# Patient Record
Sex: FEMALE | Race: WHITE | NOT HISPANIC OR LATINO | URBAN - METROPOLITAN AREA
[De-identification: names, ages, dates, MRNs, and addresses within clinical notes are randomized per-mention and may not be internally consistent; named-entity substitution may affect disease eponyms.]

---

## 2017-03-08 ENCOUNTER — IMPORTED ENCOUNTER (OUTPATIENT)
Dept: URBAN - METROPOLITAN AREA CLINIC 9 | Facility: CLINIC | Age: 77
End: 2017-03-08

## 2017-05-31 ENCOUNTER — IMPORTED ENCOUNTER (OUTPATIENT)
Dept: URBAN - METROPOLITAN AREA CLINIC 9 | Facility: CLINIC | Age: 77
End: 2017-05-31

## 2017-07-11 ENCOUNTER — IMPORTED ENCOUNTER (OUTPATIENT)
Dept: URBAN - METROPOLITAN AREA CLINIC 9 | Facility: CLINIC | Age: 77
End: 2017-07-11

## 2017-08-23 ENCOUNTER — IMPORTED ENCOUNTER (OUTPATIENT)
Dept: URBAN - METROPOLITAN AREA CLINIC 9 | Facility: CLINIC | Age: 77
End: 2017-08-23

## 2018-02-07 ENCOUNTER — IMPORTED ENCOUNTER (OUTPATIENT)
Dept: URBAN - METROPOLITAN AREA CLINIC 9 | Facility: CLINIC | Age: 78
End: 2018-02-07

## 2018-04-17 ENCOUNTER — IMPORTED ENCOUNTER (OUTPATIENT)
Dept: URBAN - METROPOLITAN AREA CLINIC 9 | Facility: CLINIC | Age: 78
End: 2018-04-17

## 2018-05-16 ENCOUNTER — IMPORTED ENCOUNTER (OUTPATIENT)
Dept: URBAN - METROPOLITAN AREA CLINIC 9 | Facility: CLINIC | Age: 78
End: 2018-05-16

## 2018-06-12 ENCOUNTER — IMPORTED ENCOUNTER (OUTPATIENT)
Dept: URBAN - METROPOLITAN AREA CLINIC 9 | Facility: CLINIC | Age: 78
End: 2018-06-12

## 2018-06-13 ENCOUNTER — IMPORTED ENCOUNTER (OUTPATIENT)
Dept: URBAN - METROPOLITAN AREA CLINIC 9 | Facility: CLINIC | Age: 78
End: 2018-06-13

## 2018-06-26 ENCOUNTER — IMPORTED ENCOUNTER (OUTPATIENT)
Dept: URBAN - METROPOLITAN AREA CLINIC 9 | Facility: CLINIC | Age: 78
End: 2018-06-26

## 2018-07-18 ENCOUNTER — IMPORTED ENCOUNTER (OUTPATIENT)
Dept: URBAN - METROPOLITAN AREA CLINIC 9 | Facility: CLINIC | Age: 78
End: 2018-07-18

## 2018-07-20 ENCOUNTER — IMPORTED ENCOUNTER (OUTPATIENT)
Dept: URBAN - METROPOLITAN AREA CLINIC 9 | Facility: CLINIC | Age: 78
End: 2018-07-20

## 2018-08-03 ENCOUNTER — IMPORTED ENCOUNTER (OUTPATIENT)
Dept: URBAN - METROPOLITAN AREA CLINIC 9 | Facility: CLINIC | Age: 78
End: 2018-08-03

## 2018-08-29 ENCOUNTER — IMPORTED ENCOUNTER (OUTPATIENT)
Dept: URBAN - METROPOLITAN AREA CLINIC 9 | Facility: CLINIC | Age: 78
End: 2018-08-29

## 2018-08-31 ENCOUNTER — IMPORTED ENCOUNTER (OUTPATIENT)
Dept: URBAN - METROPOLITAN AREA CLINIC 9 | Facility: CLINIC | Age: 78
End: 2018-08-31

## 2018-10-24 ENCOUNTER — IMPORTED ENCOUNTER (OUTPATIENT)
Dept: URBAN - METROPOLITAN AREA CLINIC 9 | Facility: CLINIC | Age: 78
End: 2018-10-24

## 2019-01-02 ENCOUNTER — IMPORTED ENCOUNTER (OUTPATIENT)
Dept: URBAN - METROPOLITAN AREA CLINIC 9 | Facility: CLINIC | Age: 79
End: 2019-01-02

## 2019-02-08 ENCOUNTER — IMPORTED ENCOUNTER (OUTPATIENT)
Dept: URBAN - METROPOLITAN AREA CLINIC 9 | Facility: CLINIC | Age: 79
End: 2019-02-08

## 2019-02-27 ENCOUNTER — IMPORTED ENCOUNTER (OUTPATIENT)
Dept: URBAN - METROPOLITAN AREA CLINIC 9 | Facility: CLINIC | Age: 79
End: 2019-02-27

## 2019-03-27 ENCOUNTER — IMPORTED ENCOUNTER (OUTPATIENT)
Dept: URBAN - METROPOLITAN AREA CLINIC 9 | Facility: CLINIC | Age: 79
End: 2019-03-27

## 2019-04-24 ENCOUNTER — IMPORTED ENCOUNTER (OUTPATIENT)
Dept: URBAN - METROPOLITAN AREA CLINIC 9 | Facility: CLINIC | Age: 79
End: 2019-04-24

## 2019-06-19 ENCOUNTER — IMPORTED ENCOUNTER (OUTPATIENT)
Dept: URBAN - METROPOLITAN AREA CLINIC 9 | Facility: CLINIC | Age: 79
End: 2019-06-19

## 2019-08-14 ENCOUNTER — IMPORTED ENCOUNTER (OUTPATIENT)
Dept: URBAN - METROPOLITAN AREA CLINIC 9 | Facility: CLINIC | Age: 79
End: 2019-08-14

## 2019-10-09 ENCOUNTER — IMPORTED ENCOUNTER (OUTPATIENT)
Dept: URBAN - METROPOLITAN AREA CLINIC 9 | Facility: CLINIC | Age: 79
End: 2019-10-09

## 2019-11-06 ENCOUNTER — IMPORTED ENCOUNTER (OUTPATIENT)
Dept: URBAN - METROPOLITAN AREA CLINIC 9 | Facility: CLINIC | Age: 79
End: 2019-11-06

## 2019-12-04 ENCOUNTER — IMPORTED ENCOUNTER (OUTPATIENT)
Dept: URBAN - METROPOLITAN AREA CLINIC 9 | Facility: CLINIC | Age: 79
End: 2019-12-04

## 2020-01-08 ENCOUNTER — IMPORTED ENCOUNTER (OUTPATIENT)
Dept: URBAN - METROPOLITAN AREA CLINIC 9 | Facility: CLINIC | Age: 80
End: 2020-01-08

## 2020-02-05 ENCOUNTER — IMPORTED ENCOUNTER (OUTPATIENT)
Dept: URBAN - METROPOLITAN AREA CLINIC 9 | Facility: CLINIC | Age: 80
End: 2020-02-05

## 2020-02-21 ENCOUNTER — IMPORTED ENCOUNTER (OUTPATIENT)
Dept: URBAN - METROPOLITAN AREA CLINIC 9 | Facility: CLINIC | Age: 80
End: 2020-02-21

## 2020-03-18 ENCOUNTER — IMPORTED ENCOUNTER (OUTPATIENT)
Dept: URBAN - METROPOLITAN AREA CLINIC 9 | Facility: CLINIC | Age: 80
End: 2020-03-18

## 2020-04-29 ENCOUNTER — IMPORTED ENCOUNTER (OUTPATIENT)
Dept: URBAN - METROPOLITAN AREA CLINIC 9 | Facility: CLINIC | Age: 80
End: 2020-04-29

## 2020-06-10 ENCOUNTER — IMPORTED ENCOUNTER (OUTPATIENT)
Dept: URBAN - METROPOLITAN AREA CLINIC 9 | Facility: CLINIC | Age: 80
End: 2020-06-10

## 2020-07-14 NOTE — PATIENT DISCUSSION
BMI Within normal limits, continue current management. Tissue Cultured Epidermal Autograft Text: The defect edges were debeveled with a #15 scalpel blade.  Given the location of the defect, shape of the defect and the proximity to free margins a tissue cultured epidermal autograft was deemed most appropriate.  The graft was then trimmed to fit the size of the defect.  The graft was then placed in the primary defect and oriented appropriately.

## 2020-07-22 ENCOUNTER — IMPORTED ENCOUNTER (OUTPATIENT)
Dept: URBAN - METROPOLITAN AREA CLINIC 9 | Facility: CLINIC | Age: 80
End: 2020-07-22

## 2020-09-02 ENCOUNTER — IMPORTED ENCOUNTER (OUTPATIENT)
Dept: URBAN - METROPOLITAN AREA CLINIC 9 | Facility: CLINIC | Age: 80
End: 2020-09-02

## 2020-10-14 ENCOUNTER — IMPORTED ENCOUNTER (OUTPATIENT)
Dept: URBAN - METROPOLITAN AREA CLINIC 9 | Facility: CLINIC | Age: 80
End: 2020-10-14

## 2020-12-09 ENCOUNTER — IMPORTED ENCOUNTER (OUTPATIENT)
Dept: URBAN - METROPOLITAN AREA CLINIC 9 | Facility: CLINIC | Age: 80
End: 2020-12-09

## 2021-02-03 ENCOUNTER — IMPORTED ENCOUNTER (OUTPATIENT)
Dept: URBAN - METROPOLITAN AREA CLINIC 9 | Facility: CLINIC | Age: 81
End: 2021-02-03

## 2021-02-22 ENCOUNTER — IMPORTED ENCOUNTER (OUTPATIENT)
Dept: URBAN - METROPOLITAN AREA CLINIC 9 | Facility: CLINIC | Age: 81
End: 2021-02-22

## 2021-03-03 ENCOUNTER — IMPORTED ENCOUNTER (OUTPATIENT)
Dept: URBAN - METROPOLITAN AREA CLINIC 9 | Facility: CLINIC | Age: 81
End: 2021-03-03

## 2021-04-07 ENCOUNTER — IMPORTED ENCOUNTER (OUTPATIENT)
Dept: URBAN - METROPOLITAN AREA CLINIC 9 | Facility: CLINIC | Age: 81
End: 2021-04-07

## 2021-05-12 ENCOUNTER — IMPORTED ENCOUNTER (OUTPATIENT)
Dept: URBAN - METROPOLITAN AREA CLINIC 9 | Facility: CLINIC | Age: 81
End: 2021-05-12

## 2021-06-16 ENCOUNTER — IMPORTED ENCOUNTER (OUTPATIENT)
Dept: URBAN - METROPOLITAN AREA CLINIC 9 | Facility: CLINIC | Age: 81
End: 2021-06-16

## 2021-07-28 ENCOUNTER — IMPORTED ENCOUNTER (OUTPATIENT)
Dept: URBAN - METROPOLITAN AREA CLINIC 9 | Facility: CLINIC | Age: 81
End: 2021-07-28

## 2021-09-08 ENCOUNTER — IMPORTED ENCOUNTER (OUTPATIENT)
Dept: URBAN - METROPOLITAN AREA CLINIC 9 | Facility: CLINIC | Age: 81
End: 2021-09-08

## 2021-09-08 PROBLEM — H35.3211: Noted: 2021-09-08

## 2021-09-08 PROBLEM — H35.3231: Noted: 2021-09-08

## 2021-10-13 ENCOUNTER — PREPPED CHART (OUTPATIENT)
Dept: URBAN - METROPOLITAN AREA CLINIC 9 | Facility: CLINIC | Age: 81
End: 2021-10-13

## 2021-10-18 ASSESSMENT — VISUAL ACUITY
OS_SC: CF 1FT SN
OD_SC: 20/30 -2 SN
OD_SC: 20/60 + SN
OD_SC: 20/30 + SN
OS_SC: 20/25 -2 SN
OD_CC: 20/50 - SN
OD_CC: 20/80 -2 SN
OD_CC: 20/30 -2 SN
OD_SC: 20/40 - SN
OS_SC: 20/400 SN
OD_SC: 20/70 SN
OS_SC: 20/30 -2 SN
OS_SC: 20/70 SN
OS_CC: 20/25 -2 SN
OD_CC: 20/200 SN
OD_CC: 20/400 SN
OS_SC: 20/60 - SN
OS_CC: 20/30 - SN
OD_CC: 20/30 SN
OD_SC: 20/30 -2 SN
OS_CC: 20/30 + SN
OS_SC: 20/30 - SN
OS_CC: 20/400 SN
OS_SC: 20/30 - SN
OD_SC: 20/60 - SN
OS_SC: 20/70 SN
OD_SC: 20/80 - SN
OD_CC: 20/30 -2 SN
OD_SC: 20/30 -2 SN
OD_CC: 20/30 - SN
OD_PH: 20/40 -2 SN
OS_SC: 20/25 - SN
OS_SC: 20/30 - SN
OS_SC: 20/30 SN
OD_CC: 20/50 -2 SN
OD_CC: 20/60 + SN
OS_CC: CF 1FT SN
OD_SC: 20/50 - SN
OD_CC: 20/25 +2 SN
OS_SC: 20/25 + SN
OS_CC: 20/40 SN
OS_SC: 20/60 - SN
OD_CC: 20/30 - SN
OS_SC: 20/40 -2 SN
OD_SC: 20/40 - SN
OD_CC: 20/40 -2 SN
OD_SC: 20/60 - SN
OD_SC: 20/40 -2 SN
OS_SC: 20/40 - SN
OS_SC: 20/30 -2 SN
OS_SC: 20/30 - SN
OD_SC: 20/50 - SN
OS_SC: 20/25 -2 SN
OS_SC: 20/30 +2 SN
OS_CC: 20/40 -2 SN
OD_SC: 20/50 - SN
OD_SC: 20/25 SN
OS_CC: 20/50 -2 SN
OS_SC: 20/30 SN
OS_SC: 20/40 - SN
OS_SC: 20/30 -2 SN
OD_SC: 20/40 - SN
OD_CC: 20/70 SN
OS_CC: 20/30 - SN
OD_SC: 20/30 - SN
OD_CC: 20/60 SN
OD_SC: 20/40 SN
OS_SC: 20/50 -2 SN
OD_SC: 20/50 -2 SN
OD_SC: 20/30 - SN
OS_SC: 20/25 - SN
OS_PH: 20/70 +2 SN
OS_CC: CF 2FT SN
OS_SC: CF 2FT SN
OD_SC: 20/80 - SN
OD_SC: 20/25 -2 SN
OS_SC: 20/30 -2 SN
OD_SC: 20/40 -2 SN
OS_SC: 20/80 - SN
OS_SC: 20/40 SN
OD_SC: 20/50 - SN
OS_CC: 20/25 -2 SN
OD_SC: 20/80 - SN
OD_SC: 20/25 - SN
OS_CC: 20/400 SN
OD_SC: 20/40 SN
OD_CC: 20/30 + SN
OD_CC: 20/400 SN
OS_CC: CF 1FT SN
OS_SC: CF 1FT SN
OD_SC: 20/60 - SN
OD_CC: 20/25 -2 SN
OS_CC: 20/25 SN
OS_CC: 20/50 + SN
OD_SC: 20/80 SN
OS_CC: 20/30 SN
OS_CC: 20/30 - SN

## 2021-10-18 ASSESSMENT — TONOMETRY
OD_IOP_MMHG: 23
OD_IOP_MMHG: 19
OD_IOP_MMHG: 16
OS_IOP_MMHG: 16
OS_IOP_MMHG: 13
OS_IOP_MMHG: 21
OD_IOP_MMHG: 18
OD_IOP_MMHG: 15
OD_IOP_MMHG: 18
OD_IOP_MMHG: 14
OS_IOP_MMHG: 15
OS_IOP_MMHG: 19
OD_IOP_MMHG: 13
OS_IOP_MMHG: 20
OS_IOP_MMHG: 17
OS_IOP_MMHG: 26
OD_IOP_MMHG: 19
OS_IOP_MMHG: 16
OD_IOP_MMHG: 19
OD_IOP_MMHG: 15
OD_IOP_MMHG: 12
OS_IOP_MMHG: 25
OD_IOP_MMHG: 17
OS_IOP_MMHG: 17
OS_IOP_MMHG: 17
OD_IOP_MMHG: 18
OS_IOP_MMHG: 20
OS_IOP_MMHG: 13
OD_IOP_MMHG: 15
OD_IOP_MMHG: 20
OD_IOP_MMHG: 20
OS_IOP_MMHG: 24
OS_IOP_MMHG: 19
OS_IOP_MMHG: 17
OD_IOP_MMHG: 15
OD_IOP_MMHG: 17
OS_IOP_MMHG: 19
OD_IOP_MMHG: 21
OS_IOP_MMHG: 12
OS_IOP_MMHG: 14
OS_IOP_MMHG: 18
OS_IOP_MMHG: 17
OD_IOP_MMHG: 18
OD_IOP_MMHG: 17
OS_IOP_MMHG: 22
OD_IOP_MMHG: 17
OS_IOP_MMHG: 14
OD_IOP_MMHG: 13
OD_IOP_MMHG: 19
OS_IOP_MMHG: 16
OS_IOP_MMHG: 12
OS_IOP_MMHG: 21
OS_IOP_MMHG: 10
OD_IOP_MMHG: 16
OD_IOP_MMHG: 9
OD_IOP_MMHG: 14
OS_IOP_MMHG: 17
OD_IOP_MMHG: 15
OD_IOP_MMHG: 20
OS_IOP_MMHG: 16
OD_IOP_MMHG: 12
OS_IOP_MMHG: 17
OD_IOP_MMHG: 17
OS_IOP_MMHG: 16
OD_IOP_MMHG: 21
OS_IOP_MMHG: 15
OS_IOP_MMHG: 17
OD_IOP_MMHG: 17
OS_IOP_MMHG: 21
OD_IOP_MMHG: 17
OS_IOP_MMHG: 23
OS_IOP_MMHG: 19
OD_IOP_MMHG: 19
OD_IOP_MMHG: 14
OS_IOP_MMHG: 21
OS_IOP_MMHG: 18
OD_IOP_MMHG: 12
OD_IOP_MMHG: 19
OS_IOP_MMHG: 19
OS_IOP_MMHG: 17
OS_IOP_MMHG: 19
OD_IOP_MMHG: 21

## 2021-10-18 ASSESSMENT — PACHYMETRY
OS_CT_UM: 554.0
OD_CT_UM: 544.0

## 2021-10-20 ENCOUNTER — ESTABLISHED PATIENT (OUTPATIENT)
Dept: URBAN - METROPOLITAN AREA CLINIC 9 | Facility: CLINIC | Age: 81
End: 2021-10-20

## 2021-10-20 DIAGNOSIS — H35.3211: ICD-10-CM

## 2021-10-20 DIAGNOSIS — H35.3221: ICD-10-CM

## 2021-10-20 PROCEDURE — 67028 INJECTION EYE DRUG: CPT

## 2021-10-20 PROCEDURE — 92134 CPTRZ OPH DX IMG PST SGM RTA: CPT

## 2021-10-20 ASSESSMENT — VISUAL ACUITY
OD_SC: 20/200
OS_SC: CF 1FT

## 2021-10-20 ASSESSMENT — TONOMETRY
OD_IOP_MMHG: 16
OS_IOP_MMHG: 18

## 2022-02-16 ENCOUNTER — CLINIC PROCEDURE ONLY (OUTPATIENT)
Dept: URBAN - METROPOLITAN AREA CLINIC 9 | Facility: CLINIC | Age: 82
End: 2022-02-16

## 2022-02-16 DIAGNOSIS — H35.3221: ICD-10-CM

## 2022-02-16 DIAGNOSIS — H35.3211: ICD-10-CM

## 2022-02-16 PROCEDURE — 6702850 BILATERAL INTRAVITREAL INJECTION

## 2022-02-16 PROCEDURE — 92134 CPTRZ OPH DX IMG PST SGM RTA: CPT

## 2022-02-16 ASSESSMENT — TONOMETRY
OD_IOP_MMHG: 20
OS_IOP_MMHG: 23

## 2022-02-16 ASSESSMENT — VISUAL ACUITY
OD_SC: 20/400
OS_SC: CF 1FT

## 2022-02-25 ENCOUNTER — ESTABLISHED PATIENT (OUTPATIENT)
Dept: URBAN - METROPOLITAN AREA CLINIC 9 | Facility: CLINIC | Age: 82
End: 2022-02-25

## 2022-02-25 DIAGNOSIS — H35.3211: ICD-10-CM

## 2022-02-25 DIAGNOSIS — H40.053: ICD-10-CM

## 2022-02-25 DIAGNOSIS — H54.0X33: ICD-10-CM

## 2022-02-25 DIAGNOSIS — H35.3221: ICD-10-CM

## 2022-02-25 DIAGNOSIS — H34.212: ICD-10-CM

## 2022-02-25 DIAGNOSIS — H35.033: ICD-10-CM

## 2022-02-25 PROCEDURE — 92014 COMPRE OPH EXAM EST PT 1/>: CPT

## 2022-02-25 PROCEDURE — 92133 CPTRZD OPH DX IMG PST SGM ON: CPT

## 2022-02-25 ASSESSMENT — VISUAL ACUITY
OD_SC: 20/400
OS_SC: CF 1FT

## 2022-02-25 ASSESSMENT — TONOMETRY
OS_IOP_MMHG: 15
OD_IOP_MMHG: 14

## 2022-04-13 ENCOUNTER — ESTABLISHED PATIENT (OUTPATIENT)
Dept: URBAN - METROPOLITAN AREA CLINIC 9 | Facility: CLINIC | Age: 82
End: 2022-04-13

## 2022-04-13 DIAGNOSIS — H34.212: ICD-10-CM

## 2022-04-13 DIAGNOSIS — H43.821: ICD-10-CM

## 2022-04-13 DIAGNOSIS — H35.033: ICD-10-CM

## 2022-04-13 DIAGNOSIS — H35.3231: ICD-10-CM

## 2022-04-13 PROCEDURE — 92134 CPTRZ OPH DX IMG PST SGM RTA: CPT

## 2022-04-13 PROCEDURE — 92014 COMPRE OPH EXAM EST PT 1/>: CPT

## 2022-04-13 PROCEDURE — 6702850 BILATERAL INTRAVITREAL INJECTION

## 2022-04-13 ASSESSMENT — TONOMETRY
OS_IOP_MMHG: 16
OD_IOP_MMHG: 13

## 2022-04-13 ASSESSMENT — VISUAL ACUITY
OS_SC: CF 2FT
OD_SC: 20/200

## 2022-06-19 RX ORDER — AMLODIPINE BESYLATE 10 MG/1
TABLET ORAL
COMMUNITY

## 2022-06-19 RX ORDER — BUDESONIDE AND FORMOTEROL FUMARATE DIHYDRATE 160; 4.5 UG/1; UG/1
AEROSOL RESPIRATORY (INHALATION)
COMMUNITY

## 2022-06-19 RX ORDER — COLESEVELAM 180 1/1
TABLET ORAL
COMMUNITY

## 2022-06-19 RX ORDER — ALBUTEROL SULFATE 90 UG/1
AEROSOL, METERED RESPIRATORY (INHALATION)
COMMUNITY

## 2022-06-19 RX ORDER — AMIODARONE HYDROCHLORIDE 200 MG/1
TABLET ORAL
COMMUNITY

## 2022-06-22 ENCOUNTER — ESTABLISHED PATIENT (OUTPATIENT)
Dept: URBAN - METROPOLITAN AREA CLINIC 9 | Facility: CLINIC | Age: 82
End: 2022-06-22

## 2022-06-22 DIAGNOSIS — H35.3231: ICD-10-CM

## 2022-06-22 PROCEDURE — 92134 CPTRZ OPH DX IMG PST SGM RTA: CPT

## 2022-06-22 PROCEDURE — 6702850 BILATERAL INTRAVITREAL INJECTION

## 2022-06-22 ASSESSMENT — VISUAL ACUITY: OD_SC: CF 1FT

## 2022-06-22 ASSESSMENT — TONOMETRY
OD_IOP_MMHG: 9
OS_IOP_MMHG: 10

## 2022-06-28 RX ORDER — DILTIAZEM HYDROCHLORIDE 300 MG/1
1 CAPSULE, EXTENDED RELEASE ORAL
COMMUNITY

## 2022-06-28 RX ORDER — EZETIMIBE 10 MG/1
TABLET ORAL
COMMUNITY

## 2022-06-28 RX ORDER — DONEPEZIL HYDROCHLORIDE 10 MG/1
TABLET, FILM COATED ORAL
COMMUNITY

## 2022-06-28 RX ORDER — DILTIAZEM HYDROCHLORIDE 90 MG/1
1 CAPSULE, EXTENDED RELEASE ORAL 3 TIMES DAILY
COMMUNITY

## 2022-06-28 RX ORDER — ENOXAPARIN SODIUM 100 MG/ML
INJECTION SUBCUTANEOUS
COMMUNITY

## 2022-06-30 RX ORDER — FLUTICASONE PROPIONATE 50 MCG
SPRAY, SUSPENSION (ML) NASAL
COMMUNITY

## 2022-06-30 RX ORDER — FUROSEMIDE 40 MG/1
40 TABLET ORAL 2 TIMES DAILY
COMMUNITY

## 2022-06-30 RX ORDER — UMECLIDINIUM BROMIDE AND VILANTEROL TRIFENATATE 62.5; 25 UG/1; UG/1
1 POWDER RESPIRATORY (INHALATION)
COMMUNITY

## 2022-06-30 RX ORDER — LISINOPRIL 5 MG/1
TABLET ORAL
COMMUNITY

## 2022-06-30 RX ORDER — TIOTROPIUM BROMIDE 18 UG/1
1 CAPSULE ORAL; RESPIRATORY (INHALATION)
COMMUNITY

## 2022-06-30 RX ORDER — IPRATROPIUM BROMIDE AND ALBUTEROL SULFATE 2.5; .5 MG/3ML; MG/3ML
SOLUTION RESPIRATORY (INHALATION)
COMMUNITY

## 2022-06-30 RX ORDER — ROSUVASTATIN CALCIUM 40 MG/1
TABLET, COATED ORAL
COMMUNITY

## 2022-06-30 RX ORDER — METOLAZONE 2.5 MG/1
TABLET ORAL
COMMUNITY
Start: 2022-05-26

## 2022-08-17 ENCOUNTER — ESTABLISHED PATIENT (OUTPATIENT)
Dept: URBAN - METROPOLITAN AREA CLINIC 9 | Facility: CLINIC | Age: 82
End: 2022-08-17

## 2022-08-17 DIAGNOSIS — H43.821: ICD-10-CM

## 2022-08-17 DIAGNOSIS — H35.033: ICD-10-CM

## 2022-08-17 DIAGNOSIS — H34.212: ICD-10-CM

## 2022-08-17 DIAGNOSIS — H35.3231: ICD-10-CM

## 2022-08-17 PROCEDURE — 92014 COMPRE OPH EXAM EST PT 1/>: CPT

## 2022-08-17 PROCEDURE — 92134 CPTRZ OPH DX IMG PST SGM RTA: CPT

## 2022-08-17 PROCEDURE — 6702850 BILATERAL INTRAVITREAL INJECTION

## 2022-08-17 ASSESSMENT — VISUAL ACUITY
OS_SC: CF 1FT
OD_SC: 20/400

## 2022-08-17 ASSESSMENT — TONOMETRY
OD_IOP_MMHG: 11
OS_IOP_MMHG: 12

## 2022-10-26 ENCOUNTER — ESTABLISHED PATIENT (OUTPATIENT)
Dept: URBAN - METROPOLITAN AREA CLINIC 9 | Facility: CLINIC | Age: 82
End: 2022-10-26

## 2022-10-26 DIAGNOSIS — H34.212: ICD-10-CM

## 2022-10-26 DIAGNOSIS — H35.3231: ICD-10-CM

## 2022-10-26 DIAGNOSIS — H35.033: ICD-10-CM

## 2022-10-26 DIAGNOSIS — H43.821: ICD-10-CM

## 2022-10-26 PROCEDURE — 6702850 BILATERAL INTRAVITREAL INJECTION

## 2022-10-26 PROCEDURE — 92134 CPTRZ OPH DX IMG PST SGM RTA: CPT

## 2022-10-26 PROCEDURE — 92014 COMPRE OPH EXAM EST PT 1/>: CPT

## 2022-10-26 ASSESSMENT — VISUAL ACUITY
OS_SC: CF 2FT
OD_SC: CF 3FT

## 2022-10-26 ASSESSMENT — TONOMETRY
OS_IOP_MMHG: 13
OD_IOP_MMHG: 17

## 2023-01-18 ENCOUNTER — ESTABLISHED PATIENT (OUTPATIENT)
Dept: URBAN - METROPOLITAN AREA CLINIC 9 | Facility: CLINIC | Age: 83
End: 2023-01-18

## 2023-01-18 DIAGNOSIS — H34.212: ICD-10-CM

## 2023-01-18 DIAGNOSIS — H35.3231: ICD-10-CM

## 2023-01-18 DIAGNOSIS — H43.821: ICD-10-CM

## 2023-01-18 DIAGNOSIS — H35.033: ICD-10-CM

## 2023-01-18 PROCEDURE — 92014 COMPRE OPH EXAM EST PT 1/>: CPT

## 2023-01-18 PROCEDURE — 67028 INJECTION EYE DRUG: CPT

## 2023-01-18 PROCEDURE — 92134 CPTRZ OPH DX IMG PST SGM RTA: CPT

## 2023-01-18 ASSESSMENT — TONOMETRY
OS_IOP_MMHG: 24
OD_IOP_MMHG: 25

## 2023-01-18 ASSESSMENT — VISUAL ACUITY
OD_SC: 20/200+1
OS_SC: CF 6FT

## 2025-06-30 NOTE — PATIENT DISCUSSION
Discussed increased risk of floaters, glaucoma, myopic degeneration and retinal detachment associated with high myopia. Medication failed protocol.    Medication: [facility]  Medication Refill Protocol Failed.  Medication Refill Protocol Failed. Courtesy Refill provided after labs per protocol guidelines. Writer confirmed, courtesy refill was not a duplicate.  Last office visit date: 05/13/25  Next appointment scheduled?: Yes